# Patient Record
Sex: MALE | Race: AMERICAN INDIAN OR ALASKA NATIVE
[De-identification: names, ages, dates, MRNs, and addresses within clinical notes are randomized per-mention and may not be internally consistent; named-entity substitution may affect disease eponyms.]

---

## 2017-03-15 ENCOUNTER — HOSPITAL ENCOUNTER (OUTPATIENT)
Dept: HOSPITAL 5 - MRI | Age: 77
Discharge: HOME | End: 2017-03-15
Attending: PSYCHIATRY & NEUROLOGY
Payer: MEDICARE

## 2017-03-15 DIAGNOSIS — I63.9: Primary | ICD-10-CM

## 2017-03-15 LAB — BUN SERPL-MCNC: 9 MG/DL (ref 9–20)

## 2017-03-15 PROCEDURE — 36415 COLL VENOUS BLD VENIPUNCTURE: CPT

## 2017-03-15 PROCEDURE — 70553 MRI BRAIN STEM W/O & W/DYE: CPT

## 2017-03-15 PROCEDURE — 84520 ASSAY OF UREA NITROGEN: CPT

## 2017-03-15 PROCEDURE — A9577 INJ MULTIHANCE: HCPCS

## 2017-03-15 PROCEDURE — 82565 ASSAY OF CREATININE: CPT

## 2017-03-15 NOTE — MAGNETIC RESONANCE REPORT
MRI BRAIN WITH/WITHOUT CONTRAST:



History: Syncope and collapse.



Technique: Multiple T1 and T2 weighted images were obtained in multiple 

planes.  Axial diffusion and gradient imaging was performed.  Post 

contrast T1 images in two planes were obtained following IV gadolinium.



Findings:



Moderate volume loss and nonspecific chronic white matter changes are 

identified. These findings are likely appropriate for the patient's 

age. There are 2 focal chronic infarcts in the peripherally right 

cerebellar hemisphere measuring up to 1 cm. No large chronic infarct.



No diffusion restriction, hemorrhage, mass effect or extra-axial fluid 

collection.



Ventricular size is normal and symmetric.  Cavum septum pellucida and 

cavum septum vergae are noted. The basal cisterns are clear.



The paranasal sinuses and mastoid air cells are well aerated.  Normal 

flow voids are identified in the appropriate vessels at the [place] 

[place].



No abnormal enhancement is identified following IV gadolinium.



Impression:

Volume loss and chronic white matter changes which are appropriate for 

this persons age.

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

No acute intracranial process or abnormal enhancement.

## 2019-08-17 ENCOUNTER — HOSPITAL ENCOUNTER (EMERGENCY)
Dept: HOSPITAL 5 - ED | Age: 79
Discharge: HOME | End: 2019-08-17
Payer: MEDICARE

## 2019-08-17 VITALS — DIASTOLIC BLOOD PRESSURE: 80 MMHG | SYSTOLIC BLOOD PRESSURE: 135 MMHG
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
## 2019-08-17 DIAGNOSIS — Z79.899: ICD-10-CM
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
## 2019-08-17 DIAGNOSIS — K21.9: ICD-10-CM

## 2019-08-17 DIAGNOSIS — E78.00: ICD-10-CM

## 2019-08-17 DIAGNOSIS — I10: ICD-10-CM
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
## 2019-08-17 DIAGNOSIS — T67.5XXA: Primary | ICD-10-CM

## 2019-08-17 DIAGNOSIS — Z98.890: ICD-10-CM

## 2019-08-17 LAB
ALBUMIN SERPL-MCNC: 3.7 G/DL (ref 3.9–5)
ALT SERPL-CCNC: 12 UNITS/L (ref 7–56)
APTT BLD: 24.8 SEC. (ref 24.2–36.6)
BACTERIA #/AREA URNS HPF: (no result) /HPF
BASOPHILS # (AUTO): 0 K/MM3 (ref 0–0.1)
BASOPHILS NFR BLD AUTO: 0.7 % (ref 0–1.8)
BILIRUB UR QL STRIP: (no result)
BLOOD UR QL VISUAL: (no result)
BUN SERPL-MCNC: 19 MG/DL (ref 9–20)
BUN/CREAT SERPL: 16 %
CALCIUM SERPL-MCNC: 9.3 MG/DL (ref 8.4–10.2)
EOSINOPHIL # BLD AUTO: 0.1 K/MM3 (ref 0–0.4)
EOSINOPHIL NFR BLD AUTO: 0.9 % (ref 0–4.3)
HCT VFR BLD CALC: 39.1 % (ref 35.5–45.6)
HEMOLYSIS INDEX: 10
HGB BLD-MCNC: 12.7 GM/DL (ref 11.8–15.2)
INR PPP: 1.12 (ref 0.87–1.13)
LYMPHOCYTES # BLD AUTO: 1.7 K/MM3 (ref 1.2–5.4)
LYMPHOCYTES NFR BLD AUTO: 29.1 % (ref 13.4–35)
MCHC RBC AUTO-ENTMCNC: 33 % (ref 32–34)
MCV RBC AUTO: 93 FL (ref 84–94)
MONOCYTES # (AUTO): 0.5 K/MM3 (ref 0–0.8)
MONOCYTES % (AUTO): 8.8 % (ref 0–7.3)
MUCOUS THREADS #/AREA URNS HPF: (no result) /HPF
PH UR STRIP: 6 [PH] (ref 5–7)
PLATELET # BLD: 200 K/MM3 (ref 140–440)
PROT UR STRIP-MCNC: (no result) MG/DL
RBC # BLD AUTO: 4.19 M/MM3 (ref 3.65–5.03)
RBC #/AREA URNS HPF: 2 /HPF (ref 0–6)
UROBILINOGEN UR-MCNC: < 2 MG/DL (ref ?–2)
WBC #/AREA URNS HPF: 1 /HPF (ref 0–6)

## 2019-08-17 PROCEDURE — 70450 CT HEAD/BRAIN W/O DYE: CPT

## 2019-08-17 PROCEDURE — 81001 URINALYSIS AUTO W/SCOPE: CPT

## 2019-08-17 PROCEDURE — 85730 THROMBOPLASTIN TIME PARTIAL: CPT

## 2019-08-17 PROCEDURE — 80053 COMPREHEN METABOLIC PANEL: CPT

## 2019-08-17 PROCEDURE — 93005 ELECTROCARDIOGRAM TRACING: CPT

## 2019-08-17 PROCEDURE — 93010 ELECTROCARDIOGRAM REPORT: CPT

## 2019-08-17 PROCEDURE — 36415 COLL VENOUS BLD VENIPUNCTURE: CPT

## 2019-08-17 PROCEDURE — 71045 X-RAY EXAM CHEST 1 VIEW: CPT

## 2019-08-17 PROCEDURE — 84484 ASSAY OF TROPONIN QUANT: CPT

## 2019-08-17 PROCEDURE — 85610 PROTHROMBIN TIME: CPT

## 2019-08-17 PROCEDURE — 85025 COMPLETE CBC W/AUTO DIFF WBC: CPT

## 2019-08-17 NOTE — XRAY REPORT
CHEST 1 VIEW



INDICATION: 

syncope.



COMPARISON: 

None



FINDINGS: Ventriculoperitoneal shunt catheter in place

SUPPORT DEVICES: None.

HEART / MEDIASTINUM: No significant abnormality.

LUNGS / PLEURA: No significant pulmonary or pleural abnormality. No pneumothorax. 



ADDITIONAL FINDINGS: 



IMPRESSION:

1. No acute findings.



Signer Name: Farhan Perkins MD 

Signed: 8/17/2019 3:45 PM

 Workstation Name: [email or web address] [place] [facility]\[email or web address]

## 2019-08-17 NOTE — CAT SCAN REPORT
CT HEAD WITHOUT CONTRAST



INDICATION / CLINICAL INFORMATION:



Near syncopal episode. Status post ventriculoperitoneal shunt placement.





TECHNIQUE:

All CT scans at this location are performed using CT dose reduction for ALARA by means of automated e
xposure control. 



COMPARISON:

Head CT 4/1/2018 and 5/24/2016. MRI brain 4/2/2018



FINDINGS:

HEMORRHAGE: No evidence of intracranial hemorrhage or extra-axial fluid collection.

EXTRA-AXIAL SPACES: Cortical sulci and sylvian fissures are enlarged reflecting a degree of parenchym
al volume loss which is within normal limits for the patient's age. Basilar cisterns have an unremark
able appearance.

VENTRICULAR SYSTEM: In the interval since the preceding study patient is undergone right parietal leonides
triculoperitoneal shunt catheter placement. Catheter tip is near the midline. Lateral ventricles are 
unchanged in size in comparison to previous shunt catheter placement study. Persistence of the cava s
eptum pellucidum and cavum vergae is incidentally noted. The third and lateral ventricles are mildly 
enlarged reflecting resonance of age related parenchymal volume loss. Ventricular size is stable.

CEREBRAL PARENCHYMA: Periventricular and deep white matter lucency is observed. This is probably seco
ndary to microvascular ischemic change. There is no indication of recent infarction. No areas of ence
phalomalacia are identified. 

MIDLINE SHIFT OR HERNIATION: There is no mass effect.

CEREBELLUM / BRAINSTEM: Brainstem and cerebellum have an unremarkable appearance.

INTRACRANIAL VESSELS:Calcified atherosclerotic plaque is present along the course of the cavernous se
gments of both internal carotid arteries. Similar findings are seen at the distal vertebral arteries.


ORBITS: visualized portions of the orbits have an unremarkable appearance.

SOFT TISSUES of HEAD: No significant abnormality.

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
ana bones, right larger than left. These are stable findings. Evaluation of bone windows reveals no 
additional abnormalities.

PARANASAL SINUSES / MASTOID AIR CELLS: Paranasal sinuses are free from inflammatory mucosal disease. 
Mastoid air cells are normally pneumatized.





IMPRESSION:

1. Interval placement of right parietal ventriculoperitoneal shunt catheter since 4/1/2018. Ventricul
ar size is unchanged.

2. Age-related central greater than cortical parenchymal volume loss.

3. No acute intracranial abnormality.



Signer Name: Scar Boyce MD 

Signed: 8/17/2019 5:10 PM

 Workstation Name: [email or web address]

## 2019-08-17 NOTE — EMERGENCY DEPARTMENT REPORT
ED Syncope HPI





- General


Chief Complaint: Syncope


Stated Complaint: POSS STEMI


Time Seen by Provider: 08/17/19 13:36


Source: patient, old records


Exam Limitations: no limitations





- History of Present Illness


Initial Comments: 





78-year-old male with a past medical history of hydrocephalus,  shunt, GERD, 

hypertension, PVD, and elevated cholesterol visits to the hospital with a near 

syncopal episode.  Patient states he was outside in the hot weather clearing 

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  He began to feel

lightheaded and therefore came inside and sat on a chair.  He denies syncope, 

fall, or head injury.  He states he only had a banana and some water for 

breakfast this morning.  He denies headache, blurred vision, abdominal pain, 

chest pain, shortness of breath, arrhythmias, nausea, vomiting, diarrhea, melena

or hematochezia.  Upon EMS arrival he was asymptomatic.  EMS interpreted EKG is 

possible ST elevation MI and therefore patient was provided nitroglycerin and 

aspirin prior to arrival.  Patient denies his neurosurgeon is affiliated with 

[facility].  He is in the process of finding a new primary care doctor. 





- Related Data


Allergies/Adverse Reactions: 


Allergies





No Known Allergies Allergy (Verified 12/11/15 12:13)


   








Home Medications: 


Ambulatory Orders





Cilostazol [Pletal] 100 mg PO BID 03/08/15 


Omeprazole [PriLOSEC] 20 mg PO QDAY 03/08/15 


Simvastatin [Zocor TAB] 40 mg PO QDAY 03/08/15 


Carvedilol [Coreg] 6.25 mg PO BID #60 tablet 03/09/15 


Cilostazol [Pletal] 100 mg PO BID  tablet 04/03/18 











ED Review of Systems


ROS: 


Stated complaint: POSS STEMI


Other details as noted in HPI





Comment: All other systems reviewed and negative





ED Past Medical Hx





- Past Medical History


Previous Medical History?: Yes


Hx Hypertension: Yes


Hx Heart Attack/AMI: No


Hx Congestive Heart Failure: No


Hx Deep Vein Thrombosis: No


Hx GERD: Yes


Hx Seizures: No


Hx Dementia: No


Additional medical history: PVD, high cholesterol





- Surgical History


Past Surgical History?: Yes


Hx Coronary Stent: No


Hx Pacemaker: No


Hx Internal Defibrillator: No


Additional Surgical History: hernia repain in 1961





- Social History


Smoking Status: Never Smoker


Substance Use Type: None





- Medications


Home Medications: 


                                Home Medications











 Medication  Instructions  Recorded  Confirmed  Last Taken  Type


 


Cilostazol [Pletal] 100 mg PO BID 03/08/15 04/01/18 11/09/16 History


 


Omeprazole [PriLOSEC] 20 mg PO QDAY 03/08/15 04/01/18 11/09/16 History


 


Simvastatin [Zocor TAB] 40 mg PO QDAY 03/08/15 04/01/18 11/09/16 History


 


Carvedilol [Coreg] 6.25 mg PO BID #60 tablet 03/09/15 04/01/18 11/09/16 Rx


 


Cilostazol [Pletal] 100 mg PO BID  tablet 04/03/18  Unknown Rx














ED Physical Exam





- General


Limitations: No Limitations





- Other


Other exam information: 





General: No acute distress


Head: Atraumatic,  shunt palpated a right occipital area


Eyes: Normal appearance, pupils equal reactive to light, extraocular movements 

intact


ENT: Normal oropharynx


Neck: Normal appearance, no C-spine tenderness, no meningismus


Chest: Clear to auscultation bilaterally, no wheezes, rales, or crackles


Cardiovascular: Regular rate and rhythm


Abdomen: Soft, nondistended, nontender, no rebound or guarding, normal bowel 

sounds


Back: Normal inspection, nontender


Extremity: Normal inspection, no deformity, full range of motion


Neuro: Alert and oriented 3, speech clear, no gross motor or sensory deficit, 

finger-nose-finger function intact


Skin: No rash, warmth, or erythema





ED Course


                                   Vital Signs











  08/17/19 08/17/19 08/17/19





  13:30 13:34 13:43


 


Temperature  97.9 F 


 


Pulse Rate 63 60 


 


Pulse Rate [   





Lying]   


 


Pulse Rate [   





Sitting]   


 


Pulse Rate [   





Standing]   


 


Respiratory 12 14 14





Rate   


 


Blood Pressure 126/70 126/70 


 


Blood Pressure  126/70 





[Left]   


 


Blood Pressure   





[Lying]   


 


Blood Pressure   





[Sitting]   


 


Blood Pressure   





[Standing]   


 


O2 Sat by Pulse  92 97





Oximetry   














  08/17/19 08/17/19 08/17/19





  14:00 14:30 15:00


 


Temperature   


 


Pulse Rate 65 65 


 


Pulse Rate [   





Lying]   


 


Pulse Rate [   





Sitting]   


 


Pulse Rate [   





Standing]   


 


Respiratory 11 L 20 





Rate   


 


Blood Pressure 124/71 124/71 132/58


 


Blood Pressure   





[Left]   


 


Blood Pressure   





[Lying]   


 


Blood Pressure   





[Sitting]   


 


Blood Pressure   





[Standing]   


 


O2 Sat by Pulse 95  





Oximetry   














  08/17/19 08/17/19 08/17/19





  15:30 15:40 16:00


 


Temperature   


 


Pulse Rate   


 


Pulse Rate [  64 





Lying]   


 


Pulse Rate [  61 





Sitting]   


 


Pulse Rate [  72 





Standing]   


 


Respiratory 13  13





Rate   


 


Blood Pressure 130/69  143/66


 


Blood Pressure   





[Left]   


 


Blood Pressure  125/65 





[Lying]   


 


Blood Pressure  115/63 





[Sitting]   


 


Blood Pressure  130/69 





[Standing]   


 


O2 Sat by Pulse 85  





Oximetry   














  08/17/19





  16:30


 


Temperature 


 


Pulse Rate 77


 


Pulse Rate [ 





Lying] 


 


Pulse Rate [ 





Sitting] 


 


Pulse Rate [ 





Standing] 


 


Respiratory 10 L





Rate 


 


Blood Pressure 135/67


 


Blood Pressure 





[Left] 


 


Blood Pressure 





[Lying] 


 


Blood Pressure 





[Sitting] 


 


Blood Pressure 





[Standing] 


 


O2 Sat by Pulse 





Oximetry 














ED Medical Decision Making





- Lab Data


Result diagrams: 


                                 08/17/19 13:45





                                 08/17/19 13:45








                                   Lab Results











  08/17/19 08/17/19 08/17/19 Range/Units





  13:45 13:45 13:45 


 


WBC  5.9    (4.5-11.0)  K/mm3


 


RBC  4.19    (3.65-5.03)  M/mm3


 


Hgb  12.7    (11.8-15.2)  gm/dl


 


Hct  39.1    (35.5-45.6)  %


 


MCV  93    (84-94)  fl


 


MCH  30    (28-32)  pg


 


MCHC  33    (32-34)  %


 


RDW  14.0    (13.2-15.2)  %


 


Plt Count  200    (140-440)  K/mm3


 


Lymph % (Auto)  29.1    (13.4-35.0)  %


 


Mono % (Auto)  8.8 H    (0.0-7.3)  %


 


Eos % (Auto)  0.9    (0.0-4.3)  %


 


Baso % (Auto)  0.7    (0.0-1.8)  %


 


Lymph #  1.7    (1.2-5.4)  K/mm3


 


Mono #  0.5    (0.0-0.8)  K/mm3


 


Eos #  0.1    (0.0-0.4)  K/mm3


 


Baso #  0.0    (0.0-0.1)  K/mm3


 


Seg Neutrophils %  60.5    (40.0-70.0)  %


 


Seg Neutrophils #  3.6    (1.8-7.7)  K/mm3


 


PT   14.1   (12.2-14.9)  Sec.


 


INR   1.12   (0.87-1.13)  


 


APTT   24.8   (24.2-36.6)  Sec.


 


Sodium    138  (137-145)  mmol/L


 


Potassium    4.5  (3.6-5.0)  mmol/L


 


Chloride    104.0  ()  mmol/L


 


Carbon Dioxide    21 L  (22-30)  mmol/L


 


Anion Gap    18  mmol/L


 


BUN    19  (9-20)  mg/dL


 


Creatinine    1.2  (0.8-1.5)  mg/dL


 


Estimated GFR    > 60  ml/min


 


BUN/Creatinine Ratio    16  %


 


Glucose    120 H  ()  mg/dL


 


Calcium    9.3  (8.4-10.2)  mg/dL


 


Total Bilirubin    0.80  (0.1-1.2)  mg/dL


 


AST    14  (5-40)  units/L


 


ALT    12  (7-56)  units/L


 


Alkaline Phosphatase    37  ()  units/L


 


Troponin T     (0.00-0.029)  ng/mL


 


Total Protein    6.8  (6.3-8.2)  g/dL


 


Albumin    3.7 L  (3.9-5)  g/dL


 


Albumin/Globulin Ratio    1.2  %


 


Urine Color     (Yellow)  


 


Urine Turbidity     (Clear)  


 


Urine pH     (5.0-7.0)  


 


Ur Specific Gravity     (1.003-1.030)  


 


Urine Protein     (Negative)  mg/dL


 


Urine Glucose (UA)     (Negative)  mg/dL


 


Urine Ketones     (Negative)  mg/dL


 


Urine Blood     (Negative)  


 


Urine Nitrite     (Negative)  


 


Urine Bilirubin     (Negative)  


 


Urine Urobilinogen     (<2.0)  mg/dL


 


Ur Leukocyte Esterase     (Negative)  


 


Urine WBC (Auto)     (0.0-6.0)  /HPF


 


Urine RBC (Auto)     (0.0-6.0)  /HPF


 


Urine Bacteria (Auto)     (Negative)  /HPF


 


Urine Mucus     /HPF














  08/17/19 08/17/19 Range/Units





  13:45 15:49 


 


WBC    (4.5-11.0)  K/mm3


 


RBC    (3.65-5.03)  M/mm3


 


Hgb    (11.8-15.2)  gm/dl


 


Hct    (35.5-45.6)  %


 


MCV    (84-94)  fl


 


MCH    (28-32)  pg


 


MCHC    (32-34)  %


 


RDW    (13.2-15.2)  %


 


Plt Count    (140-440)  K/mm3


 


Lymph % (Auto)    (13.4-35.0)  %


 


Mono % (Auto)    (0.0-7.3)  %


 


Eos % (Auto)    (0.0-4.3)  %


 


Baso % (Auto)    (0.0-1.8)  %


 


Lymph #    (1.2-5.4)  K/mm3


 


Mono #    (0.0-0.8)  K/mm3


 


Eos #    (0.0-0.4)  K/mm3


 


Baso #    (0.0-0.1)  K/mm3


 


Seg Neutrophils %    (40.0-70.0)  %


 


Seg Neutrophils #    (1.8-7.7)  K/mm3


 


PT    (12.2-14.9)  Sec.


 


INR    (0.87-1.13)  


 


APTT    (24.2-36.6)  Sec.


 


Sodium    (137-145)  mmol/L


 


Potassium    (3.6-5.0)  mmol/L


 


Chloride    ()  mmol/L


 


Carbon Dioxide    (22-30)  mmol/L


 


Anion Gap    mmol/L


 


BUN    (9-20)  mg/dL


 


Creatinine    (0.8-1.5)  mg/dL


 


Estimated GFR    ml/min


 


BUN/Creatinine Ratio    %


 


Glucose    ()  mg/dL


 


Calcium    (8.4-10.2)  mg/dL


 


Total Bilirubin    (0.1-1.2)  mg/dL


 


AST    (5-40)  units/L


 


ALT    (7-56)  units/L


 


Alkaline Phosphatase    ()  units/L


 


Troponin T  < 0.010   (0.00-0.029)  ng/mL


 


Total Protein    (6.3-8.2)  g/dL


 


Albumin    (3.9-5)  g/dL


 


Albumin/Globulin Ratio    %


 


Urine Color   Yellow  (Yellow)  


 


Urine Turbidity   Clear  (Clear)  


 


Urine pH   6.0  (5.0-7.0)  


 


Ur Specific Gravity   1.019  (1.003-1.030)  


 


Urine Protein   <15 mg/dl  (Negative)  mg/dL


 


Urine Glucose (UA)   Neg  (Negative)  mg/dL


 


Urine Ketones   Neg  (Negative)  mg/dL


 


Urine Blood   Neg  (Negative)  


 


Urine Nitrite   Neg  (Negative)  


 


Urine Bilirubin   Neg  (Negative)  


 


Urine Urobilinogen   < 2.0  (<2.0)  mg/dL


 


Ur Leukocyte Esterase   Neg  (Negative)  


 


Urine WBC (Auto)   1.0  (0.0-6.0)  /HPF


 


Urine RBC (Auto)   2.0  (0.0-6.0)  /HPF


 


Urine Bacteria (Auto)   1+  (Negative)  /HPF


 


Urine Mucus   Few  /HPF














- EKG Data


-: EKG Interpreted by Me


EKG shows normal: sinus rhythm, axis (qrs 10), QRS complexes (qrsd 88), ST-T 

waves (no stemi/t inv)


Rate: normal (61)





- EKG Data


When compared to previous EKG there are: no significant change, previous EKG 

unavailable





08/17/19 18:40


repeat ekg shows no acute changes





- Radiology Data


Radiology results: report reviewed





CHEST 1 VIEW INDICATION: syncope. COMPARISON: None FINDINGS: 

Ventriculoperitoneal shunt catheter in place SUPPORT DEVICES: None. HEART / 

MEDIASTINUM: No significant abnormality. LUNGS / PLEURA: No significant 

pulmonary or pleural abnormality. No pneumothorax. ADDITIONAL FINDINGS: 

IMPRESSION: 1. No acute findings. 





CT HEAD WITHOUT CONTRAST INDICATION / CLINICAL INFORMATION: Near syncopal 

episode. Status post ventriculoperitoneal shunt placement. TECHNIQUE: All CT 

scans at this location are performed using CT dose reduction for ALARA by means 

of automated exposure control. COMPARISON: Head CT 4/1/2018 and 5/24/2016. MRI 

brain 4/2/2018 FINDINGS: HEMORRHAGE: No evidence of intracranial hemorrhage or 

extra-axial fluid collection. EXTRA-AXIAL SPACES: Cortical sulci and sylvian 

fissures are enlarged reflecting a degree of parenchymal volume loss which is 

within normal limits for the patient's age. Basilar cisterns have an 

unremarkable appearance. VENTRICULAR SYSTEM: In the interval since the preceding

study patient is undergone right parietal ventriculoperitoneal shunt catheter 

placement. Catheter tip is near the midline. Lateral ventricles are unchanged in

size in comparison to previous shunt catheter placement study. Persistence of 

the cava septum pellucidum and cavum vergae is incidentally noted. The third and

lateral ventricles are mildly enlarged reflecting resonance of age related 

parenchymal volume loss. Ventricular size is stable. CEREBRAL PARENCHYMA: 

Periventricular and deep white matter lucency is observed. This is probably 

secondary to microvascular ischemic change. There is no indication of recent 

infarction. No areas of encephalomalacia are identified. MIDLINE SHIFT OR 

HERNIATION: There is no mass effect. CEREBELLUM / BRAINSTEM: Brainstem and 

cerebellum have an unremarkable appearance. INTRACRANIAL VESSELS:Calcified 

atherosclerotic plaque is present along the course of the cavernous segments of 

both internal carotid arteries. Similar findings are seen at the distal 

vertebral arteries. ORBITS: visualized portions of the orbits have an 

unremarkable appearance. SOFT TISSUES of HEAD: No significant abnormality. 

CALVARIUM: Bilateral jugular venous diverticulum are identified along the 

posterior aspect of the petrous bones, right larger than left. These are stable 

findings. Evaluation of bone windows reveals no additional abnormalities. 

PARANASAL SINUSES / MASTOID AIR CELLS: Paranasal sinuses are free from 

inflammatory mucosal disease. Mastoid air cells are normally pneumatized. 

IMPRESSION: 1. Interval placement of right parietal ventriculoperitoneal shunt 

catheter since 4/1/2018. Ventricular size is unchanged. 2. Age-related central 

greater than cortical parenchymal volume loss. 3. No acute intracranial 

abnormality. 





- Medical Decision Making





Patient presented to the hospital after lightheaded episode after being outside 

in the hot weather picking up [employer] for 30 minutes.  Patient's been 

asymptomatic since arrival.  EMS initially thought the patient's EKG represents 

an ST elevation MI and therefore he received aspirin and nitroglycerin.  Patient

did not have any chest pain.  His EKG today is similar to his previous EKG.  

Patient to negative troponin enzymes and EKG is unchanged 2 in the ED.  Patient

has a nonfocal neuro exam and unremarkable CT.  Patient's provided food.  Labs 

are unremarkable.  Patient offered admission for observation however, patient 

states he feels good and wants to go home.  I believe that patient symptoms are 

secondary heat exposure and exertion.





- Differential Diagnosis


heat exhaustion, dehydration, arrhythmia, MI


Critical Care Time: No


Critical care attestation.: 


If time is entered above; I have spent that time in minutes in the direct care 

of this critically ill patient, excluding procedure time.








ED Disposition


Clinical Impression: 


 Heat exposure, Near syncope





Disposition: DC-01 TO HOME OR SELFCARE


Is pt being admited?: No


Does the pt Need Aspirin: No


Condition: Stable


Instructions:  Lightheadedness (ED), Heat Exhaustion (ED)


Additional Instructions: 


You have declined admission today.  Follow-up with your doctor or with a 

doctor/clinic  provided.  Return is symptoms worsen as indicated by the 

discharge instructions.  


Referrals: 


JENNA YO MD [Primary Care Provider] - 2-3 Days


Time of Disposition: 18:45